# Patient Record
Sex: MALE | Race: BLACK OR AFRICAN AMERICAN
[De-identification: names, ages, dates, MRNs, and addresses within clinical notes are randomized per-mention and may not be internally consistent; named-entity substitution may affect disease eponyms.]

---

## 2019-10-08 ENCOUNTER — HOSPITAL ENCOUNTER (EMERGENCY)
Dept: HOSPITAL 25 - ED | Age: 19
LOS: 1 days | Discharge: HOME | End: 2019-10-09
Payer: COMMERCIAL

## 2019-10-08 DIAGNOSIS — I31.9: Primary | ICD-10-CM

## 2019-10-08 PROCEDURE — 85610 PROTHROMBIN TIME: CPT

## 2019-10-08 PROCEDURE — 71045 X-RAY EXAM CHEST 1 VIEW: CPT

## 2019-10-08 PROCEDURE — 80053 COMPREHEN METABOLIC PANEL: CPT

## 2019-10-08 PROCEDURE — 85025 COMPLETE CBC W/AUTO DIFF WBC: CPT

## 2019-10-08 PROCEDURE — 93005 ELECTROCARDIOGRAM TRACING: CPT

## 2019-10-08 PROCEDURE — 36415 COLL VENOUS BLD VENIPUNCTURE: CPT

## 2019-10-08 PROCEDURE — 84484 ASSAY OF TROPONIN QUANT: CPT

## 2019-10-08 PROCEDURE — 99283 EMERGENCY DEPT VISIT LOW MDM: CPT

## 2019-10-09 VITALS — DIASTOLIC BLOOD PRESSURE: 89 MMHG | SYSTOLIC BLOOD PRESSURE: 142 MMHG

## 2019-10-09 LAB
ALBUMIN SERPL BCG-MCNC: 4.3 G/DL (ref 3.2–5.2)
ALBUMIN/GLOB SERPL: 1.6 {RATIO} (ref 1–3)
ALP SERPL-CCNC: 61 U/L (ref 34–104)
ALT SERPL W P-5'-P-CCNC: 16 U/L (ref 7–52)
ANION GAP SERPL CALC-SCNC: 8 MMOL/L (ref 2–11)
AST SERPL-CCNC: 20 U/L (ref 13–39)
BASOPHILS # BLD AUTO: 0 10^3/UL (ref 0–0.2)
BUN SERPL-MCNC: 11 MG/DL (ref 6–24)
BUN/CREAT SERPL: 11.5 (ref 8–20)
CALCIUM SERPL-MCNC: 8.9 MG/DL (ref 8.6–10.3)
CHLORIDE SERPL-SCNC: 106 MMOL/L (ref 101–111)
EOSINOPHIL # BLD AUTO: 0.4 10^3/UL (ref 0–0.6)
GLOBULIN SER CALC-MCNC: 2.7 G/DL (ref 2–4)
GLUCOSE SERPL-MCNC: 96 MG/DL (ref 70–100)
HCO3 SERPL-SCNC: 26 MMOL/L (ref 22–32)
HCT VFR BLD AUTO: 43 % (ref 42–52)
HGB BLD-MCNC: 14.4 G/DL (ref 14–18)
INR PPP/BLD: 1.19 (ref 0.82–1.09)
LYMPHOCYTES # BLD AUTO: 2.1 10^3/UL (ref 1–4.8)
MCH RBC QN AUTO: 30 PG (ref 27–31)
MCHC RBC AUTO-ENTMCNC: 33 G/DL (ref 31–36)
MCV RBC AUTO: 89 FL (ref 80–94)
MONOCYTES # BLD AUTO: 0.6 10^3/UL (ref 0–0.8)
NEUTROPHILS # BLD AUTO: 7.3 10^3/UL (ref 1.5–7.7)
NRBC # BLD AUTO: 0 10^3/UL
NRBC BLD QL AUTO: 0.1
PLATELET # BLD AUTO: 245 10^3/UL (ref 150–450)
POTASSIUM SERPL-SCNC: 3.4 MMOL/L (ref 3.5–5)
PROT SERPL-MCNC: 7 G/DL (ref 6.4–8.9)
RBC # BLD AUTO: 4.84 10^6 /UL (ref 4.18–5.48)
SODIUM SERPL-SCNC: 140 MMOL/L (ref 135–145)
TROPONIN I SERPL-MCNC: 0 NG/ML (ref ?–0.04)
WBC # BLD AUTO: 10.5 10^3/UL (ref 3.5–10.8)

## 2019-10-09 RX ADMIN — IBUPROFEN ONE MG: 600 TABLET, FILM COATED ORAL at 00:57

## 2019-10-09 NOTE — ED
HPI Chest Pain





- HPI Summary


HPI Summary: 





Pt is a 20 y/o M presenting to the ED with a chief complaint of chest pain 

initially onset earlier today. It worsened a couple hours after initial onset, 

and was accompanied by dizziness, SOB, and nausea. The pain is worse with 

sitting forward. He denies vomiting, diaphoresis, myalgia in LE, edema in LE, 

hx of blood clot, or recent travel. Pt notes hx of scoliosis and spinal fusion.





- History of Current Complaint


Chief Complaint: EDChestPainROMI


Time Seen by Provider: 10/09/19 00:21


Hx Obtained From: Patient


Onset/Duration: Started Hours Ago, Still Present


Timing: Constant, Lasting Hours


Initial Severity: Moderate


Current Severity: Severe


Pain Intensity: 8


Pain Scale Used: 0-10 Numeric


Chest Pain Location: Right Anterior


Chest Pain Radiates: No


Aggravating Factor(s): Nothing


Alleviating Factor(s): Nothing


Associated Signs and Symptoms: Positive: Chest Pain, Dizziness, Shortness of 

Breath, Nausea.  Negative: Diaphoresis, Calf Pain/Swelling, Vomiting





- Allergy/Home Medications


Allergies/Adverse Reactions: 


 Allergies











Allergy/AdvReac Type Severity Reaction Status Date / Time


 


No Known Allergies Allergy   Verified 10/08/19 23:49











Home Medications: 


 Home Medications





NK [No Home Medications Reported]  10/09/19 [History Confirmed 10/09/19]











PMH/Surg Hx/FS Hx/Imm Hx


Previously Healthy: Yes


Endocrine/Hematology History: 


   Denies: Hx Blood Disorders - blood clot


Musculoskeletal History: Reports: Hx Scoliosis


Sensory History: Reports: Hx Contacts or Glasses


Opthamlomology History: Reports: Hx Contacts or Glasses





- Cancer History


Cancer Type, Location and Year: spinal fusion


Infectious Disease History: No


Infectious Disease History: 


   Denies: Traveled Outside the [place] in Last 30 Days





- Family History


Known Family History: Positive: Cardiac Disease - maternal grandfather MI at 

age 50





- Social History


Occupation: Student


Lives: Dormitory/Roommates


Alcohol Use: Occasionally


Hx Substance Use: Yes


Substance Use Type: Reports: Marijuana


Hx Tobacco Use: No


Smoking Status (MU): Never Smoked Tobacco





Review of Systems


Negative: Skin Diaphoresis


Positive: Chest Pain


Positive: Shortness Of Breath


Positive: Nausea.  Negative: Vomiting


Negative: Myalgia, Edema


All Other Systems Reviewed And Are Negative: Yes





Physical Exam





- Summary


Physical Exam Summary: 





Constitutional: Well-developed, Well-nourished, Alert. (-) Distressed


Skin: Warm, Dry


HENT: Normocephalic; Atraumatic


Eyes: Conjunctiva normal


Neck: Musculoskeletal ROM normal neck. (-) JVD, (-) Stridor, (-) Tracheal 

deviation


Cardio: Rhythm regular, rate tachycardic which improves with sitting up, Heart 

sounds normal; Intact distal pulses; Radial pulses are 2+ and symmetric. (-) 

Murmur


Pulmonary/Chest wall: Effort normal. (-) Respiratory distress, (-) Wheezes, (-) 

Rales


Abd: Soft, (-) tenderness, (-) Distension, (-) Guarding, (-) Rebound


Musculoskeletal: (-) Edema


Lymph: (-) Cervical adenopathy


Neuro: Alert, Oriented x3


Psych: Mood and affect Normal


Bedside US shows no pericardial effusion.





Triage Information Reviewed: Yes


Vital Signs On Initial Exam: 


 Initial Vitals











Temp Pulse Resp BP Pulse Ox


 


 98.2 F   97   18   147/96   100 


 


 10/08/19 23:48  10/08/19 23:48  10/08/19 23:48  10/08/19 23:48  10/08/19 23:48











Vital Signs Reviewed: Yes





Procedures





- Sedation


Patient Received Moderate/Deep Sedation with Procedure: No





Diagnostics





- Vital Signs


 Vital Signs











  Temp Pulse Resp BP Pulse Ox


 


 10/09/19 00:21   90  17  145/80  100


 


 10/09/19 00:03   97  30   100


 


 10/09/19 00:00   89    100


 


 10/08/19 23:51   93   147/96  100


 


 10/08/19 23:50   93    100


 


 10/08/19 23:48  98.2 F  97  18  147/96  100














- Laboratory


Lab Results: 


 Lab Results











  10/08/19 10/08/19 10/08/19 Range/Units





  23:56 23:56 23:56 


 


WBC  10.5    (3.5-10.8)  10^3/uL


 


RBC  4.84    (4.18-5.48)  10^6 /uL


 


Hgb  14.4    (14.0-18.0)  g/dL


 


Hct  43    (42-52)  %


 


MCV  89    (80-94)  fL


 


MCH  30    (27-31)  pg


 


MCHC  33    (31-36)  g/dL


 


RDW  13    (10-15)  %


 


Plt Count  245    (150-450)  10^3/uL


 


MPV  8.0    (7.4-10.4)  fL


 


Neut % (Auto)  69.5    %


 


Lymph % (Auto)  20.0    %


 


Mono % (Auto)  6.2    %


 


Eos % (Auto)  3.8    %


 


Baso % (Auto)  0.5    %


 


Absolute Neuts (auto)  7.3    (1.5-7.7)  10^3/ul


 


Absolute Lymphs (auto)  2.1    (1.0-4.8)  10^3/ul


 


Absolute Monos (auto)  0.6    (0-0.8)  10^3/ul


 


Absolute Eos (auto)  0.4    (0-0.6)  10^3/ul


 


Absolute Basos (auto)  0.0    (0-0.2)  10^3/ul


 


Absolute Nucleated RBC  0.0    10^3/ul


 


Nucleated RBC %  0.1    


 


INR (Anticoag Therapy)   1.19 H   (0.82-1.09)  


 


Sodium    140  (135-145)  mmol/L


 


Potassium    3.4 L  (3.5-5.0)  mmol/L


 


Chloride    106  (101-111)  mmol/L


 


Carbon Dioxide    26  (22-32)  mmol/L


 


Anion Gap    8  (2-11)  mmol/L


 


BUN    11  (6-24)  mg/dL


 


Creatinine    0.96  (0.67-1.17)  mg/dL


 


Est GFR ( Amer)    120.8  (>60)  


 


Est GFR (Non-Af Amer)    99.9  (>60)  


 


BUN/Creatinine Ratio    11.5  (8-20)  


 


Glucose    96  ()  mg/dL


 


Calcium    8.9  (8.6-10.3)  mg/dL


 


Total Bilirubin    0.40  (0.2-1.0)  mg/dL


 


AST    20  (13-39)  U/L


 


ALT    16  (7-52)  U/L


 


Alkaline Phosphatase    61  ()  U/L


 


Troponin I    0.00  (<0.04)  ng/mL


 


Total Protein    7.0  (6.4-8.9)  g/dL


 


Albumin    4.3  (3.2-5.2)  g/dL


 


Globulin    2.7  (2-4)  g/dL


 


Albumin/Globulin Ratio    1.6  (1-3)  











Result Diagrams: 


 10/08/19 23:56





 10/08/19 23:56


Lab Statement: Any lab studies that have been ordered have been reviewed, and 

results considered in the medical decision making process.





- Radiology


  ** CXR


Radiology Interpretation Completed By: ED Physician


Summary of Radiographic Findings: Scoliosis hardware in place. No acute 

abnormalities.  Pending official radiology report.





- EKG


  ** 2340


Cardiac Rate: NL - 93bpm


EKG Rhythm: Sinus Rhythm


ST Segment: Non-Specific


Ectopy: None


Summary of EKG Findings: EKG at 2340 shows NSR at 93bpm with ST elevation 

diffusely except in aVR which has ST depression and MD elevation. ED physician 

has interpreted and reviewed this report.





Chest Pain Course/Dx





- Course


Course Of Treatment: Patient is here with chest pain secondary to pericarditis.

  Patient had an EKG which showed pericarditis.  Patient had a negative chest x-

ray.  Patient had negative troponin.  Patient had no fever.  Patient had a 

bedside ultrasound showed no pericardial effusion.  Patient was started on 

Motrin and was instructed to follow-up with [facility]





- Diagnoses


Provider Diagnoses: 


 Pericarditis








Discharge ED





- Sign-Out/Discharge


Documenting (check all that apply): Patient Departure





- Discharge Plan


Condition: Stable


Disposition: HOME


Patient Education Materials:  Acute Pericarditis (ED)


Referrals: 


[facility] [Z.BUSINESS, APPLICATION, OTHER] - 


Additional Instructions: 


Take 600mg ibuprofen every 8 hours over the next 7 days. Follow up with [facility] in the next 1-3 days to discuss pericarditis management after 

this first week. 





Come back with any worsening shortness of breath, fever, swelling in legs, or 

any other concerning symptoms. 








- Billing Disposition and Condition


Condition: STABLE


Disposition: Home





- Attestation Statements


Document Initiated by Ana Rosaibe: Yes


Documenting Scribe: Mary Ann Leggett


Provider For Whom Crista is Documenting (Include Credential): Fredy Almodovar MD.


Scribe Attestation: 


Mary Ann MCKEON, ana rosaibed for Fredy Almodovar MD. on 10/09/19 at 0316. 


Scribe Documentation Reviewed: Yes


Provider Attestation: 


The documentation as recorded by the scribe, Mary Ann Leggett accurately 

reflects the service I personally performed and the decisions made by me, Fredy Almodovar MD.


Status of Scribe Document: Viewed

## 2020-07-25 ENCOUNTER — TELEPHONE ENCOUNTER (OUTPATIENT)
Dept: URBAN - METROPOLITAN AREA CLINIC 13 | Facility: CLINIC | Age: 20
End: 2020-07-25

## 2020-07-26 ENCOUNTER — TELEPHONE ENCOUNTER (OUTPATIENT)
Dept: URBAN - METROPOLITAN AREA CLINIC 13 | Facility: CLINIC | Age: 20
End: 2020-07-26